# Patient Record
Sex: MALE | Race: WHITE
[De-identification: names, ages, dates, MRNs, and addresses within clinical notes are randomized per-mention and may not be internally consistent; named-entity substitution may affect disease eponyms.]

---

## 2017-01-08 ENCOUNTER — HOSPITAL ENCOUNTER (EMERGENCY)
Dept: HOSPITAL 13 - EME | Age: 82
Discharge: HOME | End: 2017-01-08
Payer: COMMERCIAL

## 2017-01-08 VITALS — WEIGHT: 262.79 LBS | BODY MASS INDEX: 35.59 KG/M2 | HEIGHT: 72 IN

## 2017-01-08 VITALS — SYSTOLIC BLOOD PRESSURE: 140 MMHG | DIASTOLIC BLOOD PRESSURE: 68 MMHG

## 2017-01-08 DIAGNOSIS — Z79.82: ICD-10-CM

## 2017-01-08 DIAGNOSIS — R07.9: ICD-10-CM

## 2017-01-08 DIAGNOSIS — Z87.891: ICD-10-CM

## 2017-01-08 DIAGNOSIS — Z95.5: ICD-10-CM

## 2017-01-08 DIAGNOSIS — Z88.2: ICD-10-CM

## 2017-01-08 DIAGNOSIS — Z88.6: ICD-10-CM

## 2017-01-08 DIAGNOSIS — I25.10: ICD-10-CM

## 2017-01-08 DIAGNOSIS — R42: Primary | ICD-10-CM

## 2017-01-08 DIAGNOSIS — I10: ICD-10-CM

## 2017-01-08 LAB
ALP SERPL-CCNC: 66 IU/L (ref 3–129)
ANION GAP: 11 MEQ/L (ref 2–14)
APTT BLD: 26.2 S (ref 25–32)
BILIRUB DIRECT SERPL-MCNC: 0.1 MG/DL (ref 0–0.3)
CARBAMAZEPINE SERPL-MCNC: < 0.01 NG/ML (ref 0–0.3)
CHLORIDE: 103 MEQ/L (ref 99–109)
ETHANOL SERPL-MCNC: NEGATIVE MG/DL
GFR SERPLBLD CREATININE-BSD FMLAMALE: > 59 ML/MIN/
GLUCOSE SERPL-MCNC: 105 MG/DL (ref 70–99)
HCO3 BLD-SCNC: 22 MEQ/L (ref 20–31)
HCT VFR BLD CALC: 40.6 % (ref 38–50)
INTER. NORMALIZED RATIO: 1
LIPASE SERPL-CCNC: 20 U/L (ref 1–51)
MCH RBC QN AUTO: 29.7 PG (ref 29–34)
MCV: 92.1 FL (ref 86–99)
MONOCYTES # BLD MANUAL: 32.3 G/DL (ref 30–36)
PLATELET COUNT: 330 K/UL (ref 156–360)
POTASSIUM SERPL-SCNC: (no result) MEQ/L (ref 3.7–5.4)
POTASSIUM SERPL-SCNC: 4.2 MEQ/L (ref 3.7–5.4)
PROTHROMBIN TIME: 10.2 S (ref 9.2–11.2)
RBC # BLD AUTO: 4.41 M/UL (ref 4–5.5)
RBC DIS.WIDTH-CV: 14.9 % (ref 11.8–14.6)
RBC DIS.WIDTH-SD: 48.5 % (ref 39–53)
SODIUM: 136 MEQ/L (ref 136–147)
TOTAL BILIRUBIN: 0.3 MG/DL (ref 0–1)
UREA NITROGEN (BUN): 15 MG/DL (ref 9–23)
VARIANT LYMPHS NFR BLD MANUAL: 9.7 UM^3 (ref 9–12.4)
WBC # BLD AUTO: 8.2 K/UL (ref 4.1–10.2)

## 2017-01-24 ENCOUNTER — HOSPITAL ENCOUNTER (OUTPATIENT)
Dept: HOSPITAL 13 - EME | Age: 82
Setting detail: OBSERVATION
LOS: 3 days | Discharge: HOME | End: 2017-01-27
Attending: INTERNAL MEDICINE | Admitting: INTERNAL MEDICINE
Payer: COMMERCIAL

## 2017-01-24 VITALS — HEIGHT: 72 IN | BODY MASS INDEX: 35.38 KG/M2 | WEIGHT: 261.25 LBS

## 2017-01-24 DIAGNOSIS — E66.9: ICD-10-CM

## 2017-01-24 DIAGNOSIS — Z87.19: ICD-10-CM

## 2017-01-24 DIAGNOSIS — N40.0: ICD-10-CM

## 2017-01-24 DIAGNOSIS — Z95.1: ICD-10-CM

## 2017-01-24 DIAGNOSIS — R06.02: ICD-10-CM

## 2017-01-24 DIAGNOSIS — K21.9: ICD-10-CM

## 2017-01-24 DIAGNOSIS — E78.5: ICD-10-CM

## 2017-01-24 DIAGNOSIS — Z87.828: ICD-10-CM

## 2017-01-24 DIAGNOSIS — Z79.899: ICD-10-CM

## 2017-01-24 DIAGNOSIS — I34.0: ICD-10-CM

## 2017-01-24 DIAGNOSIS — Z90.2: ICD-10-CM

## 2017-01-24 DIAGNOSIS — Z79.82: ICD-10-CM

## 2017-01-24 DIAGNOSIS — R42: ICD-10-CM

## 2017-01-24 DIAGNOSIS — I10: ICD-10-CM

## 2017-01-24 DIAGNOSIS — Z95.5: ICD-10-CM

## 2017-01-24 DIAGNOSIS — I25.700: Primary | ICD-10-CM

## 2017-01-24 LAB
ANION GAP: 10 MEQ/L (ref 2–14)
CARBAMAZEPINE SERPL-MCNC: < 0.01 NG/ML (ref 0–0.3)
CHLORIDE: 107 MEQ/L (ref 99–109)
ETHANOL SERPL-MCNC: NEGATIVE MG/DL
GFR SERPLBLD CREATININE-BSD FMLAMALE: > 59 ML/MIN/
GLUCOSE SERPL-MCNC: 103 MG/DL (ref 70–99)
HCO3 BLD-SCNC: 25 MEQ/L (ref 20–31)
HCT VFR BLD CALC: 40.6 % (ref 38–50)
MCH RBC QN AUTO: 30 PG (ref 29–34)
MCV: 92.3 FL (ref 86–99)
MONOCYTES # BLD MANUAL: 32.5 G/DL (ref 30–36)
PLATELET COUNT: 306 K/UL (ref 156–360)
POTASSIUM SERPL-SCNC: 4.3 MEQ/L (ref 3.7–5.4)
RBC # BLD AUTO: 4.4 M/UL (ref 4–5.5)
RBC DIS.WIDTH-CV: 14.8 % (ref 11.8–14.6)
RBC DIS.WIDTH-SD: 48.2 % (ref 39–53)
SODIUM: 142 MEQ/L (ref 136–147)
UREA NITROGEN (BUN): 14 MG/DL (ref 9–23)
VARIANT LYMPHS NFR BLD MANUAL: 9.8 UM^3 (ref 9–12.4)
WBC # BLD AUTO: 7.8 K/UL (ref 4.1–10.2)

## 2017-01-24 PROCEDURE — C1894 INTRO/SHEATH, NON-LASER: HCPCS

## 2017-01-24 PROCEDURE — G0378 HOSPITAL OBSERVATION PER HR: HCPCS

## 2017-01-24 PROCEDURE — C1887 CATHETER, GUIDING: HCPCS

## 2017-01-24 PROCEDURE — C1769 GUIDE WIRE: HCPCS

## 2017-01-24 PROCEDURE — C1760 CLOSURE DEV, VASC: HCPCS

## 2017-01-25 VITALS — SYSTOLIC BLOOD PRESSURE: 110 MMHG | DIASTOLIC BLOOD PRESSURE: 58 MMHG

## 2017-01-25 VITALS — SYSTOLIC BLOOD PRESSURE: 114 MMHG | DIASTOLIC BLOOD PRESSURE: 56 MMHG

## 2017-01-25 VITALS — SYSTOLIC BLOOD PRESSURE: 120 MMHG | DIASTOLIC BLOOD PRESSURE: 58 MMHG

## 2017-01-25 VITALS — DIASTOLIC BLOOD PRESSURE: 52 MMHG | SYSTOLIC BLOOD PRESSURE: 122 MMHG

## 2017-01-25 VITALS — DIASTOLIC BLOOD PRESSURE: 71 MMHG | SYSTOLIC BLOOD PRESSURE: 148 MMHG

## 2017-01-25 VITALS — DIASTOLIC BLOOD PRESSURE: 55 MMHG | SYSTOLIC BLOOD PRESSURE: 106 MMHG

## 2017-01-25 LAB
CARBAMAZEPINE SERPL-MCNC: 0.01 NG/ML (ref 0–0.3)
CARBAMAZEPINE SERPL-MCNC: 0.01 NG/ML (ref 0–0.3)
ETHANOL SERPL-MCNC: NEGATIVE MG/DL
ETHANOL SERPL-MCNC: NEGATIVE MG/DL
HDL CHOLESTEROL: 43 MG/DL
LDL CHOLESTEROL: 109 MG/DL
NON-HDL CHOLESTEROL: 129 MG/DL
TOTAL CHOLESTEROL: 172 MG/DL
TRIGLYCERIDES: 101 MG/DL

## 2017-01-26 VITALS — DIASTOLIC BLOOD PRESSURE: 58 MMHG | SYSTOLIC BLOOD PRESSURE: 114 MMHG

## 2017-01-26 VITALS — DIASTOLIC BLOOD PRESSURE: 58 MMHG | SYSTOLIC BLOOD PRESSURE: 130 MMHG

## 2017-01-26 VITALS — DIASTOLIC BLOOD PRESSURE: 67 MMHG | SYSTOLIC BLOOD PRESSURE: 152 MMHG

## 2017-01-26 VITALS — SYSTOLIC BLOOD PRESSURE: 150 MMHG | DIASTOLIC BLOOD PRESSURE: 73 MMHG

## 2017-01-26 LAB
ALP SERPL-CCNC: 50 IU/L (ref 3–129)
ANION GAP: 7 MEQ/L (ref 2–14)
CHLORIDE: 104 MEQ/L (ref 99–109)
EOSINOPHIL # BLD: 0.2 K/UL (ref 0–0.3)
EOSINOPHIL (%): 1.9 % (ref 0–5)
GFR SERPLBLD CREATININE-BSD FMLAMALE: > 59 ML/MIN/
GLUCOSE SERPL-MCNC: 89 MG/DL (ref 70–99)
HCO3 BLD-SCNC: 28 MEQ/L (ref 20–31)
HCT VFR BLD CALC: 35.6 % (ref 38–50)
IMM GRANULOCYTES # BLD: 0 K/UL
IMMATURE GRANULOCYTE (%): 0.3 % (ref 0–0.7)
INTER. NORMALIZED RATIO: 1
LYMPHOCYTE COUNT: 2.1 K/UL (ref 1–2.8)
MCH RBC QN AUTO: 30 PG (ref 29–34)
MCV: 93.7 FL (ref 86–99)
MONOCYTE (%): 13.5 % (ref 3–12)
MONOCYTES # BLD MANUAL: 32 G/DL (ref 30–36)
MONOCYTES # BLD: 1.3 K/UL (ref 0–0.8)
NEUTROPHIL (%): 62.2 % (ref 45–76)
NEUTROPHIL COUNT: 5.8 K/UL (ref 1.8–6.4)
NRBC (%): 0 /100 WBC (ref 0–0)
PAPPENHEIMER BOD BLD QL SMEAR: 0 K/UL (ref 0–0.1)
PLATELET COUNT: 282 K/UL (ref 156–360)
POTASSIUM SERPL-SCNC: 4.7 MEQ/L (ref 3.7–5.4)
PROTHROMBIN TIME: 10.6 S (ref 9.2–11.2)
RBC # BLD AUTO: 3.8 M/UL (ref 4–5.5)
RBC DIS.WIDTH-CV: 14.9 % (ref 11.8–14.6)
RBC DIS.WIDTH-SD: 50.9 % (ref 39–53)
SAMPLE HEMOLYSIS CHECK: 0
SAMPLE ICTERIC CHECK: 0
SODIUM: 139 MEQ/L (ref 136–147)
SPECIMEN VOL UR: 0 ML
TOTAL BILIRUBIN: 0.6 MG/DL (ref 0–1)
UREA NITROGEN (BUN): 17 MG/DL (ref 9–23)
VARIANT LYMPHS NFR BLD MANUAL: 10.7 UM^3 (ref 9–12.4)
WBC # BLD AUTO: 9.4 K/UL (ref 4.1–10.2)

## 2017-01-27 VITALS — DIASTOLIC BLOOD PRESSURE: 65 MMHG | SYSTOLIC BLOOD PRESSURE: 119 MMHG

## 2017-01-27 VITALS — DIASTOLIC BLOOD PRESSURE: 49 MMHG | SYSTOLIC BLOOD PRESSURE: 99 MMHG

## 2017-01-27 VITALS — DIASTOLIC BLOOD PRESSURE: 47 MMHG | SYSTOLIC BLOOD PRESSURE: 103 MMHG

## 2017-02-15 ENCOUNTER — HOSPITAL ENCOUNTER (OUTPATIENT)
Dept: HOSPITAL 13 - EME | Age: 82
Setting detail: OBSERVATION
LOS: 1 days | Discharge: HOME | End: 2017-02-16
Attending: HOSPITALIST | Admitting: INTERNAL MEDICINE
Payer: COMMERCIAL

## 2017-02-15 VITALS — BODY MASS INDEX: 33.35 KG/M2 | HEIGHT: 72 IN | WEIGHT: 246.26 LBS

## 2017-02-15 VITALS — SYSTOLIC BLOOD PRESSURE: 136 MMHG | DIASTOLIC BLOOD PRESSURE: 63 MMHG

## 2017-02-15 DIAGNOSIS — Z79.02: ICD-10-CM

## 2017-02-15 DIAGNOSIS — J44.1: Primary | ICD-10-CM

## 2017-02-15 DIAGNOSIS — Z79.82: ICD-10-CM

## 2017-02-15 DIAGNOSIS — I25.10: ICD-10-CM

## 2017-02-15 DIAGNOSIS — K52.9: ICD-10-CM

## 2017-02-15 DIAGNOSIS — Z82.49: ICD-10-CM

## 2017-02-15 DIAGNOSIS — Z87.891: ICD-10-CM

## 2017-02-15 DIAGNOSIS — E87.5: ICD-10-CM

## 2017-02-15 DIAGNOSIS — N17.9: ICD-10-CM

## 2017-02-15 DIAGNOSIS — Z95.1: ICD-10-CM

## 2017-02-15 DIAGNOSIS — I10: ICD-10-CM

## 2017-02-15 DIAGNOSIS — E86.0: ICD-10-CM

## 2017-02-15 LAB
ANION GAP: 13 MEQ/L (ref 2–14)
BASE EXCESS: -1.8 MEQ/L
BICARBONATE: 23.7 MEQ/L (ref 22–26)
CARBAMAZEPINE SERPL-MCNC: 0.06 NG/ML (ref 0–0.3)
CARBAMAZEPINE SERPL-MCNC: 0.08 NG/ML (ref 0–0.3)
CARBOXY HGB: 1.6 % (ref 0–5)
CHLORIDE: 105 MEQ/L (ref 99–109)
CREAT UR-MCNC: (no result) MG/DL
ETHANOL SERPL-MCNC: NEGATIVE MG/DL
ETHANOL SERPL-MCNC: NEGATIVE MG/DL
GFR SERPLBLD CREATININE-BSD FMLAMALE: 32 ML/MIN/
GLUCOSE SERPL-MCNC: 103 MG/DL (ref 70–99)
HCO3 BLD-SCNC: 20 MEQ/L (ref 20–31)
HCT VFR BLD CALC: 38.5 % (ref 38–50)
LABORATORY COMMENT REPORT: (no result)
MCH RBC QN AUTO: 29.8 PG (ref 29–34)
MCV: 88.9 FL (ref 86–99)
METHEMOGLOBIN: 1.1 % (ref 0–1.5)
MONOCYTES # BLD MANUAL: 33.5 G/DL (ref 30–36)
O2 SATURATION: 96.4 % (ref 95–99)
PCO2: 42 MM HG (ref 35–45)
PLATELET COUNT: 375 K/UL (ref 156–360)
PO2: 99 MM HG (ref 80–100)
POTASSIUM SERPL-SCNC: 5.3 MEQ/L (ref 3.7–5.4)
PROT SERPL-MCNC: (no result) G/DL
RBC # BLD AUTO: 4.33 M/UL (ref 4–5.5)
RBC DIS.WIDTH-CV: 14.6 % (ref 11.8–14.6)
RBC DIS.WIDTH-SD: 46.5 % (ref 39–53)
SODIUM: 138 MEQ/L (ref 136–147)
TOTAL RESP RATE: 17 RESP/MIN
UREA NITROGEN (BUN): 53 MG/DL (ref 9–23)
VARIANT LYMPHS NFR BLD MANUAL: 10.1 UM^3 (ref 9–12.4)
WBC # BLD AUTO: 5.4 K/UL (ref 4.1–10.2)

## 2017-02-15 PROCEDURE — G0378 HOSPITAL OBSERVATION PER HR: HCPCS

## 2017-02-16 VITALS — DIASTOLIC BLOOD PRESSURE: 63 MMHG | SYSTOLIC BLOOD PRESSURE: 135 MMHG

## 2017-02-16 VITALS — DIASTOLIC BLOOD PRESSURE: 73 MMHG | SYSTOLIC BLOOD PRESSURE: 157 MMHG

## 2017-02-16 VITALS — DIASTOLIC BLOOD PRESSURE: 77 MMHG | SYSTOLIC BLOOD PRESSURE: 168 MMHG

## 2017-02-16 LAB
ANION GAP: 8 MEQ/L (ref 2–14)
CHLORIDE: 106 MEQ/L (ref 99–109)
GFR SERPLBLD CREATININE-BSD FMLAMALE: 41 ML/MIN/
GLUCOSE SERPL-MCNC: 168 MG/DL (ref 70–99)
HCO3 BLD-SCNC: 24 MEQ/L (ref 20–31)
HCT VFR BLD CALC: 34.8 % (ref 38–50)
MCH RBC QN AUTO: 30.2 PG (ref 29–34)
MCV: 89.9 FL (ref 86–99)
MONOCYTES # BLD MANUAL: 33.6 G/DL (ref 30–36)
PLATELET COUNT: 347 K/UL (ref 156–360)
POTASSIUM SERPL-SCNC: 5.2 MEQ/L (ref 3.7–5.4)
RBC # BLD AUTO: 3.87 M/UL (ref 4–5.5)
RBC DIS.WIDTH-CV: 14.8 % (ref 11.8–14.6)
RBC DIS.WIDTH-SD: 48.7 % (ref 39–53)
SAMPLE HEMOLYSIS CHECK: 1
SAMPLE ICTERIC CHECK: 0
SODIUM: 138 MEQ/L (ref 136–147)
SPECIMEN VOL UR: 1 ML
UREA NITROGEN (BUN): 43 MG/DL (ref 9–23)
VARIANT LYMPHS NFR BLD MANUAL: 9.8 UM^3 (ref 9–12.4)
WBC # BLD AUTO: 4 K/UL (ref 4.1–10.2)

## 2017-08-09 ENCOUNTER — APPOINTMENT (OUTPATIENT)
Age: 82
Setting detail: DERMATOLOGY
End: 2017-08-09

## 2017-08-09 DIAGNOSIS — D485 NEOPLASM OF UNCERTAIN BEHAVIOR OF SKIN: ICD-10-CM

## 2017-08-09 DIAGNOSIS — Z85.9 PERSONAL HISTORY OF MALIGNANT NEOPLASM, UNSPECIFIED: ICD-10-CM

## 2017-08-09 DIAGNOSIS — L66.1 LICHEN PLANOPILARIS: ICD-10-CM

## 2017-08-09 PROBLEM — D48.5 NEOPLASM OF UNCERTAIN BEHAVIOR OF SKIN: Status: ACTIVE | Noted: 2017-08-09

## 2017-08-09 PROCEDURE — 99214 OFFICE O/P EST MOD 30 MIN: CPT | Mod: 25

## 2017-08-09 PROCEDURE — 11300 SHAVE SKIN LESION 0.5 CM/<: CPT

## 2017-08-09 PROCEDURE — 11307 SHAVE SKIN LESION 1.1-2.0 CM: CPT

## 2017-08-09 PROCEDURE — OTHER PRESCRIPTION: OTHER

## 2017-08-09 PROCEDURE — OTHER ADDITIONAL NOTES: OTHER

## 2017-08-09 PROCEDURE — OTHER SHAVE REMOVAL: OTHER

## 2017-08-09 PROCEDURE — 11301 SHAVE SKIN LESION 0.6-1.0 CM: CPT

## 2017-08-09 RX ORDER — CLOBETASOL PROPIONATE 0.5 MG/ML
SOLUTION TOPICAL QHS
Qty: 2 | Refills: 1 | Status: ERX | COMMUNITY
Start: 2017-08-09

## 2017-08-09 ASSESSMENT — LOCATION DETAILED DESCRIPTION DERM
LOCATION DETAILED: LEFT SUPERIOR MEDIAL UPPER BACK
LOCATION DETAILED: RIGHT POSTERIOR SHOULDER
LOCATION DETAILED: LEFT CLAVICULAR NECK

## 2017-08-09 ASSESSMENT — LOCATION SIMPLE DESCRIPTION DERM
LOCATION SIMPLE: LEFT UPPER BACK
LOCATION SIMPLE: LEFT ANTERIOR NECK
LOCATION SIMPLE: RIGHT SHOULDER

## 2017-08-09 ASSESSMENT — LOCATION ZONE DERM
LOCATION ZONE: NECK
LOCATION ZONE: ARM
LOCATION ZONE: TRUNK

## 2017-08-09 NOTE — PROCEDURE: ADDITIONAL NOTES
Additional Notes: No evidence recurrence at prior Mohs sites. Bxs today as above. Pt has been non-compliant c f/u and emphasized need for f/u as heavy actinic damage primarily on upper extr and upper trunk. Will re-eval at f/u for additional lesions which may require intv.

## 2017-08-09 NOTE — PROCEDURE: SHAVE REMOVAL
Anesthesia Volume In Cc: -
Notification Instructions: Patient will be notified of biopsy results. However, patient instructed to call the office if not contacted within 2 weeks.
X Size Of Lesion In Cm (Optional): 0
Detail Level: Detailed
Medical Necessity Information: It is in your best interest to select a reason for this procedure from the list below. All of these items fulfill various CMS LCD requirements except the new and changing color options.
Hemostasis: Drysol
Consent was obtained from the patient. The risks and benefits to therapy were discussed in detail. Specifically, the risks of infection, scarring, bleeding, prolonged wound healing, incomplete removal, allergy to anesthesia, nerve injury and recurrence were addressed. Prior to the procedure, the treatment site was clearly identified and confirmed by the patient. All components of Universal Protocol/PAUSE Rule completed.
Render Post-Care Instructions In Note?: no
Anesthesia Type: 2% lidocaine with epinephrine and a 1:10 solution of 8.4% sodium bicarbonate
Biopsy Method: Dermablade
Medical Necessity Clause: This procedure was medically necessary because the lesion that was treated was:
Billing Type: Third-Party Bill
Size Of Lesion In Cm (Required): 1
Path Notes (To The Dermatopathologist): Please check margins.
Wound Care: Bacitracin
Post-Care Instructions: I reviewed with the patient in detail post-care instructions. Patient is to keep the biopsy site dry overnight, and then apply bacitracin twice daily until healed. Patient may apply hydrogen peroxide soaks to remove any crusting.
Size Of Lesion In Cm (Required): 0.5
Size Of Lesion In Cm (Required): 1.2

## 2017-08-30 ENCOUNTER — APPOINTMENT (OUTPATIENT)
Age: 82
Setting detail: DERMATOLOGY
End: 2017-08-30

## 2017-08-30 DIAGNOSIS — L82.0 INFLAMED SEBORRHEIC KERATOSIS: ICD-10-CM

## 2017-08-30 DIAGNOSIS — D22 MELANOCYTIC NEVI: ICD-10-CM

## 2017-08-30 PROBLEM — D22.9 MELANOCYTIC NEVI, UNSPECIFIED: Status: ACTIVE | Noted: 2017-08-30

## 2017-08-30 PROCEDURE — OTHER ADDITIONAL NOTES: OTHER

## 2017-08-30 PROCEDURE — 99213 OFFICE O/P EST LOW 20 MIN: CPT

## 2017-08-30 PROCEDURE — OTHER COUNSELING: OTHER

## 2017-08-30 NOTE — PROCEDURE: ADDITIONAL NOTES
Additional Notes: Mild DN as noted above, no evidence of residual ds, monitor clinically.\\n\\nNo other complaints on this date. Will have f/u in 2 months for rpt screening exam as PHSC and significant actinic damage. See my prior notes and dictations.

## 2018-05-15 ENCOUNTER — HOSPITAL ENCOUNTER (EMERGENCY)
Dept: HOSPITAL 13 - EME | Age: 83
Discharge: HOME | End: 2018-05-15
Payer: COMMERCIAL

## 2018-05-15 VITALS — BODY MASS INDEX: 34.34 KG/M2 | HEIGHT: 72 IN | WEIGHT: 253.53 LBS

## 2018-05-15 VITALS — SYSTOLIC BLOOD PRESSURE: 135 MMHG | DIASTOLIC BLOOD PRESSURE: 72 MMHG

## 2018-05-15 DIAGNOSIS — Z79.82: ICD-10-CM

## 2018-05-15 DIAGNOSIS — Z87.891: ICD-10-CM

## 2018-05-15 DIAGNOSIS — Z88.2: ICD-10-CM

## 2018-05-15 DIAGNOSIS — J44.9: ICD-10-CM

## 2018-05-15 DIAGNOSIS — R10.33: Primary | ICD-10-CM

## 2018-05-15 DIAGNOSIS — Z95.1: ICD-10-CM

## 2018-05-15 DIAGNOSIS — K43.9: ICD-10-CM

## 2018-05-15 DIAGNOSIS — Z88.5: ICD-10-CM

## 2018-05-15 DIAGNOSIS — I25.10: ICD-10-CM

## 2018-05-15 DIAGNOSIS — E78.5: ICD-10-CM

## 2018-05-15 DIAGNOSIS — I10: ICD-10-CM

## 2018-05-15 LAB
ALBUMIN SERPL-MCNC: 4.5 G/DL (ref 3.2–4.8)
ALP SERPL-CCNC: 85 IU/L (ref 3–129)
ALT (GPT): 16 IU/L (ref 3–49)
AST (GOT): 17 IU/L (ref 2–34)
BASOPHILS # BLD AUTO: 13.3 G/DL (ref 12.5–16.6)
BILIRUB DIRECT SERPL-MCNC: 0.2 MG/DL (ref 0–0.3)
CHLORIDE: 102 MEQ/L (ref 99–109)
CREATININE: 1 MG/DL (ref 0.6–1.3)
GFR SERPLBLD CREATININE-BSD FMLAMALE: > 59 ML/MIN/ (ref 58.99–99999)
GLUCOSE SERPL-MCNC: 119 MG/DL (ref 70–99)
HCO3 BLD-SCNC: 22 MEQ/L (ref 20–31)
HCT VFR BLD CALC: 39.3 % (ref 38–50)
LIPASE SERPL-CCNC: 11 U/L (ref 1–51)
MCH RBC QN AUTO: 30.5 PG (ref 29–34)
MCV: 90.1 FL (ref 86–99)
MONOCYTES # BLD MANUAL: 33.8 G/DL (ref 30–36)
NRBC (%): 0 /100 WBC (ref 0–0)
PLAT.SUFFICIENCY: ADEQUATE
PLATELET COUNT: 342 K/UL (ref 156–360)
POTASSIUM SERPL-SCNC: 4.5 MEQ/L (ref 3.7–5.4)
RBC # BLD AUTO: 4.36 M/UL (ref 4–5.5)
RBC DIS.WIDTH-CV: 15.4 % (ref 11.8–14.6)
RBC DIS.WIDTH-SD: 51.1 % (ref 39–53)
SODIUM: 136 MEQ/L (ref 136–147)
TOTAL BILIRUBIN: 0.4 MG/DL (ref 0–1)
TOTAL PROTEIN: 7.4 G/DL (ref 6.4–8.3)
UREA NITROGEN (BUN): 14 MG/DL (ref 9–23)
WBC # BLD AUTO: 13.7 K/UL (ref 4.1–10.2)

## 2018-10-31 ENCOUNTER — APPOINTMENT (OUTPATIENT)
Age: 83
Setting detail: DERMATOLOGY
End: 2018-10-31

## 2018-10-31 DIAGNOSIS — L20.89 OTHER ATOPIC DERMATITIS: ICD-10-CM

## 2018-10-31 DIAGNOSIS — Z85.9 PERSONAL HISTORY OF MALIGNANT NEOPLASM, UNSPECIFIED: ICD-10-CM

## 2018-10-31 DIAGNOSIS — L66.1 LICHEN PLANOPILARIS: ICD-10-CM

## 2018-10-31 PROBLEM — D48.5 NEOPLASM OF UNCERTAIN BEHAVIOR OF SKIN: Status: ACTIVE | Noted: 2018-10-31

## 2018-10-31 PROBLEM — L30.9 DERMATITIS, UNSPECIFIED: Status: ACTIVE | Noted: 2018-10-31

## 2018-10-31 PROCEDURE — OTHER ADDITIONAL NOTES: OTHER

## 2018-10-31 PROCEDURE — 11101: CPT

## 2018-10-31 PROCEDURE — 11100: CPT

## 2018-10-31 PROCEDURE — OTHER PRESCRIPTION: OTHER

## 2018-10-31 PROCEDURE — 99213 OFFICE O/P EST LOW 20 MIN: CPT | Mod: 25

## 2018-10-31 PROCEDURE — OTHER MIPS QUALITY: OTHER

## 2018-10-31 PROCEDURE — OTHER BIOPSY BY PUNCH METHOD: OTHER

## 2018-10-31 PROCEDURE — OTHER BIOPSY BY SHAVE METHOD: OTHER

## 2018-10-31 RX ORDER — CLOBETASOL PROPIONATE 0.5 MG/ML
SOLUTION TOPICAL QHS
Qty: 1 | Refills: 1 | Status: ERX

## 2018-10-31 RX ORDER — TRIAMCINOLONE ACETONIDE 1 MG/G
0.1% CREAM TOPICAL BID
Qty: 1 | Refills: 0 | Status: ERX | COMMUNITY
Start: 2018-10-31

## 2018-10-31 ASSESSMENT — LOCATION SIMPLE DESCRIPTION DERM
LOCATION SIMPLE: RIGHT UPPER BACK
LOCATION SIMPLE: RIGHT SCALP
LOCATION SIMPLE: POSTERIOR NECK

## 2018-10-31 ASSESSMENT — LOCATION DETAILED DESCRIPTION DERM
LOCATION DETAILED: LEFT MEDIAL TRAPEZIAL NECK
LOCATION DETAILED: RIGHT MEDIAL FRONTAL SCALP
LOCATION DETAILED: RIGHT MID-UPPER BACK

## 2018-10-31 ASSESSMENT — LOCATION ZONE DERM
LOCATION ZONE: TRUNK
LOCATION ZONE: NECK
LOCATION ZONE: SCALP

## 2018-10-31 NOTE — PROCEDURE: ADDITIONAL NOTES
Detail Level: Simple
Additional Notes: See my prior notes, pt relates feels has been well controlled c periodic use of Clob SS and will renew to use prn, educated on safe use of topical steroid
Additional Notes: Favor dermatitis clinically, bxs to clarify, [facility] cr in interim. RTC in 2wks for SR/re-eval if no intv issues
Additional Notes: No evidence of recurrence at prior surgical sites, non-compliant c f/u and emphasized compliance to optimize ongoing surveillance

## 2018-10-31 NOTE — HPI: RASH
How Severe Is Your Rash?: mild
Is This A New Presentation, Or A Follow-Up?: Rash
Additional History: Pt would like refill on clobetasol scalp solution.

## 2018-10-31 NOTE — PROCEDURE: BIOPSY BY SHAVE METHOD
Post-Care Instructions: I reviewed with the patient in detail post-care instructions. Patient is to keep the biopsy site dry overnight, and then apply bacitracin twice daily until healed. Patient may apply hydrogen peroxide soaks to remove any crusting.
Anesthesia Volume In Cc (Will Not Render If 0): 0.5
Detail Level: Detailed
Destruction After The Procedure: No
Hemostasis: Drysol
Type Of Destruction Used: Curettage
Silver Nitrate Text: The wound bed was treated with silver nitrate after the biopsy was performed.
Additional Anesthesia Volume In Cc (Will Not Render If 0): 0
Electrodesiccation And Curettage Text: The wound bed was treated with electrodesiccation and curettage after the biopsy was performed.
Wound Care: Petrolatum
Curettage Text: The wound bed was treated with curettage after the biopsy was performed.
Dressing: bandage
Size Of Lesion In Cm: 0.8
Notification Instructions: Patient will be notified of biopsy results. However, patient instructed to call the office if not contacted within 2 weeks.
Biopsy Method: Dermablade
Electrodesiccation Text: The wound bed was treated with electrodesiccation after the biopsy was performed.
Was A Bandage Applied: Yes
Consent: Written consent was obtained and risks were reviewed including but not limited to scarring, infection, bleeding, scabbing, incomplete removal, nerve damage and allergy to anesthesia.
Billing Type: Third-Party Bill
Cryotherapy Text: The wound bed was treated with cryotherapy after the biopsy was performed.
Biopsy Type: H and E
Depth Of Biopsy: dermis
Anesthesia Type: 1% lidocaine with epinephrine

## 2018-10-31 NOTE — PROCEDURE: BIOPSY BY PUNCH METHOD
Patient Will Remove Sutures At Home?: No
Epidermal Sutures: 4-0 Ethilon
Punch Size In Mm: 3
Was A Bandage Applied: Yes
Home Suture Removal Text: Patient was provided a home suture removal kit and will remove their sutures at home.  If they have any questions or difficulties they will call the office.
Anesthesia Type: 1% lidocaine with epinephrine
Anesthesia Volume In Cc (Will Not Render If 0): 0.5
Hemostasis: None
Post-Care Instructions: I reviewed with the patient in detail post-care instructions. Patient is to keep the biopsy site dry overnight, and then apply bacitracin twice daily until healed. Patient may apply hydrogen peroxide soaks to remove any crusting.
Suture Removal: 14 days
X Size Of Lesion In Cm (Optional): 0
Notification Instructions: Patient will be notified of biopsy results. However, patient instructed to call the office if not contacted within 2 weeks.
Dressing: bandage
Wound Care: Bacitracin
Billing Type: Third-Party Bill
Consent: Written consent was obtained and risks were reviewed including but not limited to scarring, infection, bleeding, scabbing, incomplete removal, nerve damage and allergy to anesthesia.
Biopsy Type: H and E
Detail Level: Detailed

## 2018-10-31 NOTE — PROCEDURE: MIPS QUALITY
Quality 130: Documentation Of Current Medications In The Medical Record: Current Medications Documented
Quality 226: Preventive Care And Screening: Tobacco Use: Screening And Cessation Intervention: Patient screened for tobacco and is an ex-smoker
Quality 131: Pain Assessment And Follow-Up: Pain assessment using a standardized tool is documented as negative, no follow-up plan required
Quality 431: Preventive Care And Screening: Unhealthy Alcohol Use - Screening: Patient screened for unhealthy alcohol use using a single question and scores less than 2 times per year
Quality 111:Pneumonia Vaccination Status For Older Adults: Pneumococcal Vaccination not Administered or Previously Received, Reason not Otherwise Specified
Detail Level: Detailed
Quality 110: Preventive Care And Screening: Influenza Immunization: Influenza Immunization not Administered because Patient Refused.

## 2018-11-14 ENCOUNTER — APPOINTMENT (OUTPATIENT)
Age: 83
Setting detail: DERMATOLOGY
End: 2018-11-14

## 2018-11-14 DIAGNOSIS — Z48.02 ENCOUNTER FOR REMOVAL OF SUTURES: ICD-10-CM

## 2018-11-14 DIAGNOSIS — L30.9 DERMATITIS, UNSPECIFIED: ICD-10-CM

## 2018-11-14 PROBLEM — C44.529 SQUAMOUS CELL CARCINOMA OF SKIN OF OTHER PART OF TRUNK: Status: ACTIVE | Noted: 2018-11-14

## 2018-11-14 PROCEDURE — OTHER COUNSELING: OTHER

## 2018-11-14 PROCEDURE — 99213 OFFICE O/P EST LOW 20 MIN: CPT

## 2018-11-14 PROCEDURE — OTHER CONSULTATION FOR MOHS SURGERY: OTHER

## 2018-11-14 PROCEDURE — OTHER MIPS QUALITY: OTHER

## 2018-11-14 PROCEDURE — OTHER ADDITIONAL NOTES: OTHER

## 2018-11-14 ASSESSMENT — LOCATION ZONE DERM
LOCATION ZONE: NECK
LOCATION ZONE: TRUNK
LOCATION ZONE: TRUNK
LOCATION ZONE: NECK

## 2018-11-14 ASSESSMENT — LOCATION SIMPLE DESCRIPTION DERM
LOCATION SIMPLE: RIGHT UPPER BACK
LOCATION SIMPLE: POSTERIOR NECK
LOCATION SIMPLE: POSTERIOR NECK
LOCATION SIMPLE: RIGHT LOWER BACK

## 2018-11-14 ASSESSMENT — LOCATION DETAILED DESCRIPTION DERM
LOCATION DETAILED: LEFT LATERAL TRAPEZIAL NECK
LOCATION DETAILED: RIGHT SUPERIOR LATERAL MIDBACK
LOCATION DETAILED: LEFT LATERAL TRAPEZIAL NECK
LOCATION DETAILED: RIGHT INFERIOR UPPER BACK

## 2018-11-14 NOTE — PROCEDURE: MIPS QUALITY
Quality 130: Documentation Of Current Medications In The Medical Record: Current Medications Documented
Quality 110: Preventive Care And Screening: Influenza Immunization: Influenza Immunization not Administered because Patient Refused.
Quality 226: Preventive Care And Screening: Tobacco Use: Screening And Cessation Intervention: Patient screened for tobacco and is an ex-smoker
Quality 111:Pneumonia Vaccination Status For Older Adults: Pneumococcal Vaccination not Administered or Previously Received, Reason not Otherwise Specified
Quality 131: Pain Assessment And Follow-Up: Pain assessment using a standardized tool is documented as negative, no follow-up plan required
Quality 431: Preventive Care And Screening: Unhealthy Alcohol Use - Screening: Patient screened for unhealthy alcohol use using a single question and scores less than 2 times per year
Detail Level: Detailed

## 2018-11-14 NOTE — PROCEDURE: ADDITIONAL NOTES
Additional Notes: D/w pt rec for Mohs and agreeable, familiar c procedure as has had prior by our practice. Will be scheduled 12/11/18.
Detail Level: Simple
Additional Notes: Clinically correlates, subacute, will apply [facility] cr as Rxed, further based on course

## 2019-02-05 ENCOUNTER — APPOINTMENT (OUTPATIENT)
Age: 84
Setting detail: DERMATOLOGY
End: 2019-02-05

## 2019-02-05 PROBLEM — C44.529 SQUAMOUS CELL CARCINOMA OF SKIN OF OTHER PART OF TRUNK: Status: ACTIVE | Noted: 2019-02-05

## 2019-02-05 PROCEDURE — OTHER MIPS QUALITY: OTHER

## 2019-02-05 PROCEDURE — 13102 CMPLX RPR TRUNK ADDL 5CM/<: CPT

## 2019-02-05 PROCEDURE — 17313 MOHS 1 STAGE T/A/L: CPT

## 2019-02-05 PROCEDURE — OTHER REPAIR NOTE: OTHER

## 2019-02-05 PROCEDURE — OTHER ADDITIONAL NOTES: OTHER

## 2019-02-05 PROCEDURE — OTHER PRESCRIPTION: OTHER

## 2019-02-05 PROCEDURE — OTHER MOHS SURGERY: OTHER

## 2019-02-05 PROCEDURE — 13101 CMPLX RPR TRUNK 2.6-7.5 CM: CPT

## 2019-02-05 RX ORDER — CEPHALEXIN 500 MG/1
TABLET ORAL TID
Qty: 30 | Refills: 0 | Status: ERX | COMMUNITY
Start: 2019-02-05

## 2019-02-05 NOTE — PROCEDURE: MIPS QUALITY
Detail Level: Detailed
Quality 130: Documentation Of Current Medications In The Medical Record: Current Medications Documented
Quality 131: Pain Assessment And Follow-Up: Pain assessment using a standardized tool is documented as negative, no follow-up plan required
Quality 111:Pneumonia Vaccination Status For Older Adults: Pneumococcal Vaccination not Administered or Previously Received, Reason not Otherwise Specified
Quality 110: Preventive Care And Screening: Influenza Immunization: Influenza Immunization not Administered because Patient Refused.
Quality 226: Preventive Care And Screening: Tobacco Use: Screening And Cessation Intervention: Patient screened for tobacco and is an ex-smoker
Quality 431: Preventive Care And Screening: Unhealthy Alcohol Use - Screening: Patient screened for unhealthy alcohol use using a single question and scores less than 2 times per year

## 2019-02-05 NOTE — PROCEDURE: MIPS QUALITY
Quality 130: Documentation Of Current Medications In The Medical Record: Current Medications Documented
Quality 431: Preventive Care And Screening: Unhealthy Alcohol Use - Screening: Patient screened for unhealthy alcohol use using a single question and scores less than 2 times per year
Quality 111:Pneumonia Vaccination Status For Older Adults: Pneumococcal Vaccination not Administered or Previously Received, Reason not Otherwise Specified
Quality 226: Preventive Care And Screening: Tobacco Use: Screening And Cessation Intervention: Patient screened for tobacco and is an ex-smoker
Quality 110: Preventive Care And Screening: Influenza Immunization: Influenza Immunization not Administered because Patient Refused.
Detail Level: Detailed
Quality 131: Pain Assessment And Follow-Up: Pain assessment using a standardized tool is documented as negative, no follow-up plan required

## 2019-02-13 ENCOUNTER — APPOINTMENT (OUTPATIENT)
Age: 84
Setting detail: DERMATOLOGY
End: 2019-02-13

## 2019-02-13 PROBLEM — C44.92 SQUAMOUS CELL CARCINOMA OF SKIN, UNSPECIFIED: Status: ACTIVE | Noted: 2019-02-13

## 2019-02-13 PROCEDURE — OTHER ADDITIONAL NOTES: OTHER

## 2019-02-13 PROCEDURE — OTHER MIPS QUALITY: OTHER

## 2019-02-13 NOTE — PROCEDURE: MIPS QUALITY
Quality 226: Preventive Care And Screening: Tobacco Use: Screening And Cessation Intervention: Patient screened for tobacco and is an ex-smoker
Quality 131: Pain Assessment And Follow-Up: Pain assessment using a standardized tool is documented as negative, no follow-up plan required
Quality 110: Preventive Care And Screening: Influenza Immunization: Influenza Immunization not Administered because Patient Refused.
Detail Level: Detailed
Quality 130: Documentation Of Current Medications In The Medical Record: Current Medications Documented
Quality 431: Preventive Care And Screening: Unhealthy Alcohol Use - Screening: Patient screened for unhealthy alcohol use using a single question and scores less than 2 times per year
Quality 111:Pneumonia Vaccination Status For Older Adults: Pneumococcal Vaccination not Administered or Previously Received, Reason not Otherwise Specified

## 2019-02-13 NOTE — PROCEDURE: ADDITIONAL NOTES
Detail Level: Simple
Additional Notes: Wound check on this date and surgical site healing approriately s evidence significant wound opening or infection. RTC in 1wk and will plan for SR at this time.

## 2019-02-19 ENCOUNTER — APPOINTMENT (OUTPATIENT)
Age: 84
Setting detail: DERMATOLOGY
End: 2019-02-19

## 2019-02-19 DIAGNOSIS — Z48.02 ENCOUNTER FOR REMOVAL OF SUTURES: ICD-10-CM

## 2019-02-19 DIAGNOSIS — D485 NEOPLASM OF UNCERTAIN BEHAVIOR OF SKIN: ICD-10-CM

## 2019-02-19 PROBLEM — D48.5 NEOPLASM OF UNCERTAIN BEHAVIOR OF SKIN: Status: ACTIVE | Noted: 2019-02-19

## 2019-02-19 PROCEDURE — OTHER ADDITIONAL NOTES: OTHER

## 2019-02-19 PROCEDURE — OTHER SUTURE REMOVAL (GLOBAL PERIOD): OTHER

## 2019-02-19 PROCEDURE — OTHER MIPS QUALITY: OTHER

## 2019-02-19 ASSESSMENT — LOCATION DETAILED DESCRIPTION DERM: LOCATION DETAILED: RIGHT SUPERIOR UPPER BACK

## 2019-02-19 ASSESSMENT — LOCATION ZONE DERM: LOCATION ZONE: TRUNK

## 2019-02-19 ASSESSMENT — LOCATION SIMPLE DESCRIPTION DERM: LOCATION SIMPLE: RIGHT UPPER BACK

## 2019-02-19 NOTE — PROCEDURE: SUTURE REMOVAL (GLOBAL PERIOD)
Detail Level: Detailed
Add 05759 Cpt? (Important Note: In 2017 The Use Of 97122 Is Being Tracked By Cms To Determine Future Global Period Reimbursement For Global Periods): no

## 2019-02-19 NOTE — PROCEDURE: MIPS QUALITY
Quality 110: Preventive Care And Screening: Influenza Immunization: Influenza Immunization not Administered because Patient Refused.
Quality 431: Preventive Care And Screening: Unhealthy Alcohol Use - Screening: Patient screened for unhealthy alcohol use using a single question and scores less than 2 times per year
Detail Level: Detailed
Quality 226: Preventive Care And Screening: Tobacco Use: Screening And Cessation Intervention: Patient screened for tobacco and is an ex-smoker
Quality 111:Pneumonia Vaccination Status For Older Adults: Pneumococcal Vaccination not Administered or Previously Received, Reason not Otherwise Specified
Quality 130: Documentation Of Current Medications In The Medical Record: Current Medications Documented
Quality 131: Pain Assessment And Follow-Up: Pain assessment using a standardized tool is documented as negative, no follow-up plan required

## 2019-02-19 NOTE — PROCEDURE: ADDITIONAL NOTES
Additional Notes: Surgical site healing appropriately, will re-eval at f/u. RTC in 1m for re-eval if no intv issues
Detail Level: Simple
Additional Notes: L arm- likely NMSC, rec for bx to clarify, pt declined on this date, says will consider at f/u, RTC in 1m for re-eval if no intv issues, can f/u anytime earlier if agreeable for bx.

## 2019-10-24 ENCOUNTER — APPOINTMENT (OUTPATIENT)
Age: 84
Setting detail: DERMATOLOGY
End: 2019-10-24

## 2019-10-24 DIAGNOSIS — D485 NEOPLASM OF UNCERTAIN BEHAVIOR OF SKIN: ICD-10-CM

## 2019-10-24 DIAGNOSIS — L66.1 LICHEN PLANOPILARIS: ICD-10-CM

## 2019-10-24 DIAGNOSIS — L81.4 OTHER MELANIN HYPERPIGMENTATION: ICD-10-CM

## 2019-10-24 DIAGNOSIS — Z85.828 PERSONAL HISTORY OF OTHER MALIGNANT NEOPLASM OF SKIN: ICD-10-CM

## 2019-10-24 DIAGNOSIS — L57.8 OTHER SKIN CHANGES DUE TO CHRONIC EXPOSURE TO NONIONIZING RADIATION: ICD-10-CM

## 2019-10-24 DIAGNOSIS — L57.0 ACTINIC KERATOSIS: ICD-10-CM

## 2019-10-24 PROBLEM — D48.5 NEOPLASM OF UNCERTAIN BEHAVIOR OF SKIN: Status: ACTIVE | Noted: 2019-10-24

## 2019-10-24 PROCEDURE — OTHER PRESCRIPTION: OTHER

## 2019-10-24 PROCEDURE — OTHER REASSURANCE: OTHER

## 2019-10-24 PROCEDURE — 17003 DESTRUCT PREMALG LES 2-14: CPT

## 2019-10-24 PROCEDURE — OTHER LIQUID NITROGEN: OTHER

## 2019-10-24 PROCEDURE — OTHER ADDITIONAL NOTES: OTHER

## 2019-10-24 PROCEDURE — 99213 OFFICE O/P EST LOW 20 MIN: CPT | Mod: 25

## 2019-10-24 PROCEDURE — 17000 DESTRUCT PREMALG LESION: CPT

## 2019-10-24 PROCEDURE — OTHER MIPS QUALITY: OTHER

## 2019-10-24 PROCEDURE — OTHER COUNSELING: OTHER

## 2019-10-24 RX ORDER — CLOBETASOL PROPIONATE 0.5 MG/ML
0.05% SOLUTION TOPICAL QHS
Qty: 2 | Refills: 1 | Status: ERX

## 2019-10-24 ASSESSMENT — LOCATION SIMPLE DESCRIPTION DERM
LOCATION SIMPLE: RIGHT CHEEK
LOCATION SIMPLE: RIGHT SCALP
LOCATION SIMPLE: CHEST
LOCATION SIMPLE: RIGHT SHOULDER
LOCATION SIMPLE: RIGHT SHOULDER
LOCATION SIMPLE: NOSE
LOCATION SIMPLE: CHEST
LOCATION SIMPLE: NOSE

## 2019-10-24 ASSESSMENT — LOCATION DETAILED DESCRIPTION DERM
LOCATION DETAILED: NASAL SUPRATIP
LOCATION DETAILED: NASAL TIP
LOCATION DETAILED: RIGHT CENTRAL MALAR CHEEK
LOCATION DETAILED: RIGHT POSTERIOR SHOULDER
LOCATION DETAILED: NASAL TIP
LOCATION DETAILED: LEFT LATERAL SUPERIOR CHEST
LOCATION DETAILED: LEFT LATERAL SUPERIOR CHEST
LOCATION DETAILED: RIGHT POSTERIOR SHOULDER
LOCATION DETAILED: RIGHT MEDIAL FRONTAL SCALP

## 2019-10-24 ASSESSMENT — LOCATION ZONE DERM
LOCATION ZONE: TRUNK
LOCATION ZONE: ARM
LOCATION ZONE: FACE
LOCATION ZONE: SCALP
LOCATION ZONE: NOSE
LOCATION ZONE: NOSE
LOCATION ZONE: ARM
LOCATION ZONE: TRUNK

## 2019-10-24 NOTE — PROCEDURE: MIPS QUALITY
Quality 431: Preventive Care And Screening: Unhealthy Alcohol Use - Screening: Patient screened for unhealthy alcohol use using a single question and scores less than 2 times per year
Detail Level: Detailed
Quality 226: Preventive Care And Screening: Tobacco Use: Screening And Cessation Intervention: Patient screened for tobacco and is an ex-smoker
Quality 130: Documentation Of Current Medications In The Medical Record: Current Medications Documented
Quality 110: Preventive Care And Screening: Influenza Immunization: Influenza Immunization not Administered because Patient Refused.
Quality 131: Pain Assessment And Follow-Up: Pain assessment using a standardized tool is documented as negative, no follow-up plan required
Quality 111:Pneumonia Vaccination Status For Older Adults: Pneumococcal Vaccination not Administered or Previously Received, Reason not Otherwise Specified

## 2019-10-24 NOTE — PROCEDURE: ADDITIONAL NOTES
Additional Notes: No evidence recurrence at prior txed sites. Note- pt c significant actinic damage, no clear focal lesion requiring bx today but will need close surveillance. Additionally could consider field tx and will d/w pt further at f/u. Pt relates that intermittently has been sick and why unable to make appts and a more palliative approach to care may be warranted. Will cont to d/w pt. RTC in  for re-eval if no intv issues.
Detail Level: Simple
Additional Notes: R cheek- rec bx to clarify, declined, wished for obs, will re-eval at 3m f/u if no intv changes
Additional Notes: Quiescent curr, responded well to Clob SS and will renew to use prn, further based on course

## 2025-01-03 NOTE — PROCEDURE: COUNSELING
Blood clearance received from PCP, Dr. Jansen 01/03/2025 for patient to hold  ASA 7 days prior to procedure.      Copy of form sent to scanning.      Patient called with no answer; VM left with brief message regarding writer wanting to discuss results (rectal swab; antibiotic treatment) as well as procedure instructions.  Callback number provided in message.    
Detail Level: Zone
Detail Level: Detailed